# Patient Record
Sex: FEMALE | Race: WHITE | NOT HISPANIC OR LATINO | Employment: UNEMPLOYED | URBAN - METROPOLITAN AREA
[De-identification: names, ages, dates, MRNs, and addresses within clinical notes are randomized per-mention and may not be internally consistent; named-entity substitution may affect disease eponyms.]

---

## 2023-08-02 ENCOUNTER — OFFICE VISIT (OUTPATIENT)
Dept: PULMONOLOGY | Facility: CLINIC | Age: 63
End: 2023-08-02
Payer: COMMERCIAL

## 2023-08-02 ENCOUNTER — HOSPITAL ENCOUNTER (OUTPATIENT)
Dept: GENERAL RADIOLOGY | Facility: HOSPITAL | Age: 63
Discharge: HOME | End: 2023-08-02
Admitting: INTERNAL MEDICINE
Payer: COMMERCIAL

## 2023-08-02 VITALS
WEIGHT: 268.6 LBS | DIASTOLIC BLOOD PRESSURE: 83 MMHG | OXYGEN SATURATION: 94 % | HEART RATE: 61 BPM | RESPIRATION RATE: 16 BRPM | BODY MASS INDEX: 42.16 KG/M2 | HEIGHT: 67 IN | SYSTOLIC BLOOD PRESSURE: 133 MMHG

## 2023-08-02 DIAGNOSIS — G47.33 OBSTRUCTIVE SLEEP APNEA: ICD-10-CM

## 2023-08-02 DIAGNOSIS — J41.8 MIXED SIMPLE AND MUCOPURULENT CHRONIC BRONCHITIS: Primary | ICD-10-CM

## 2023-08-02 DIAGNOSIS — J41.8 MIXED SIMPLE AND MUCOPURULENT CHRONIC BRONCHITIS: ICD-10-CM

## 2023-08-02 DIAGNOSIS — E66.01 MORBID (SEVERE) OBESITY DUE TO EXCESS CALORIES: ICD-10-CM

## 2023-08-02 PROCEDURE — 71046 X-RAY EXAM CHEST 2 VIEWS: CPT

## 2023-08-02 RX ORDER — PANTOPRAZOLE SODIUM 40 MG/1
1 TABLET, DELAYED RELEASE ORAL DAILY
COMMUNITY
Start: 2023-07-08

## 2023-08-02 RX ORDER — LORATADINE 10 MG/1
1 TABLET ORAL DAILY
COMMUNITY
Start: 2023-07-08

## 2023-08-02 RX ORDER — IPRATROPIUM BROMIDE AND ALBUTEROL SULFATE 2.5; .5 MG/3ML; MG/3ML
SOLUTION RESPIRATORY (INHALATION)
COMMUNITY
Start: 2023-05-25

## 2023-08-02 RX ORDER — GUAIFENESIN 600 MG/1
TABLET, EXTENDED RELEASE ORAL
COMMUNITY
Start: 2023-05-25

## 2023-08-02 RX ORDER — GLIMEPIRIDE 1 MG/1
1 TABLET ORAL DAILY
COMMUNITY
Start: 2023-07-08

## 2023-08-02 RX ORDER — TIOTROPIUM BROMIDE AND OLODATEROL 3.124; 2.736 UG/1; UG/1
2 SPRAY, METERED RESPIRATORY (INHALATION)
Qty: 1 EACH | Refills: 11 | Status: SHIPPED | OUTPATIENT
Start: 2023-08-02

## 2023-08-02 RX ORDER — EMPAGLIFLOZIN 25 MG/1
TABLET, FILM COATED ORAL
COMMUNITY
Start: 2023-07-26

## 2023-08-09 RX ORDER — TIOTROPIUM BROMIDE AND OLODATEROL 3.124; 2.736 UG/1; UG/1
2 SPRAY, METERED RESPIRATORY (INHALATION)
Qty: 2 EACH | Refills: 0 | COMMUNITY
Start: 2023-08-09 | End: 2023-08-10

## 2023-08-17 ENCOUNTER — TELEPHONE (OUTPATIENT)
Dept: PULMONOLOGY | Facility: CLINIC | Age: 63
End: 2023-08-17
Payer: COMMERCIAL

## 2023-08-17 NOTE — TELEPHONE ENCOUNTER
AMERICOI--- Patient's pft referral was sent back to me due to patient calling and canceling her test and refused to reschedule.

## 2023-09-05 ENCOUNTER — TELEPHONE (OUTPATIENT)
Dept: PULMONOLOGY | Facility: CLINIC | Age: 63
End: 2023-09-05
Payer: COMMERCIAL

## 2023-09-05 NOTE — TELEPHONE ENCOUNTER
Patient called and asked if Dr. Hillman could put in another PFT in for her. The other one is closed out for her. Patient would like a call back. Thanks

## 2023-09-05 NOTE — TELEPHONE ENCOUNTER
Heather, referral coordinator, reopened the PFT. Called and notified patient she stated she would call scheduling to get PFT scheduled.

## 2023-09-18 ENCOUNTER — HOSPITAL ENCOUNTER (OUTPATIENT)
Dept: RESPIRATORY THERAPY | Facility: HOSPITAL | Age: 63
Discharge: HOME | End: 2023-09-18
Admitting: INTERNAL MEDICINE
Payer: COMMERCIAL

## 2023-09-18 DIAGNOSIS — E66.01 MORBID (SEVERE) OBESITY DUE TO EXCESS CALORIES: ICD-10-CM

## 2023-09-18 DIAGNOSIS — G47.33 OBSTRUCTIVE SLEEP APNEA: ICD-10-CM

## 2023-09-18 DIAGNOSIS — J41.8 MIXED SIMPLE AND MUCOPURULENT CHRONIC BRONCHITIS: ICD-10-CM

## 2023-09-18 PROCEDURE — 94726 PLETHYSMOGRAPHY LUNG VOLUMES: CPT

## 2023-09-18 PROCEDURE — 94060 EVALUATION OF WHEEZING: CPT

## 2023-09-18 PROCEDURE — 94729 DIFFUSING CAPACITY: CPT

## 2023-09-18 RX ORDER — ALBUTEROL SULFATE 2.5 MG/3ML
2.5 SOLUTION RESPIRATORY (INHALATION) ONCE
Status: DISCONTINUED | OUTPATIENT
Start: 2023-09-18 | End: 2023-09-19 | Stop reason: HOSPADM

## 2023-09-18 RX ORDER — ALBUTEROL SULFATE 2.5 MG/3ML
2.5 SOLUTION RESPIRATORY (INHALATION) ONCE
Status: COMPLETED | OUTPATIENT
Start: 2023-09-18 | End: 2023-09-18

## 2023-09-18 RX ADMIN — ALBUTEROL SULFATE 2.5 MG: 2.5 SOLUTION RESPIRATORY (INHALATION) at 11:02

## 2024-01-03 ENCOUNTER — OFFICE VISIT (OUTPATIENT)
Dept: PULMONOLOGY | Facility: CLINIC | Age: 64
End: 2024-01-03
Payer: COMMERCIAL

## 2024-01-03 VITALS
WEIGHT: 271 LBS | TEMPERATURE: 95.9 F | RESPIRATION RATE: 18 BRPM | DIASTOLIC BLOOD PRESSURE: 75 MMHG | SYSTOLIC BLOOD PRESSURE: 129 MMHG | HEIGHT: 67 IN | HEART RATE: 78 BPM | OXYGEN SATURATION: 93 % | BODY MASS INDEX: 42.53 KG/M2

## 2024-01-03 DIAGNOSIS — G47.33 OBSTRUCTIVE SLEEP APNEA: ICD-10-CM

## 2024-01-03 DIAGNOSIS — J41.8 MIXED SIMPLE AND MUCOPURULENT CHRONIC BRONCHITIS: Primary | ICD-10-CM

## 2024-01-03 DIAGNOSIS — E66.01 MORBID (SEVERE) OBESITY DUE TO EXCESS CALORIES: ICD-10-CM

## 2024-01-03 PROCEDURE — 99213 OFFICE O/P EST LOW 20 MIN: CPT | Performed by: INTERNAL MEDICINE

## 2024-01-03 NOTE — PROGRESS NOTES
Pulmonary Office Follow-up    Subjective     JAIR Nick is seen today at the office for   Chief Complaint   Patient presents with    Mixed simple and mucopurulent chronic bronchitis    Follow-up     3 Month          HPI  JAIR Nick is a 63 y.o. female with a PMH significant for chronic bronchitis and sleep apnea presents for follow-up patient denies any shortness of breath cough wheeze or any significant expectoration she has not been compliant with her CPAP but intends to start using it more regularly she denies any chest pain or fever or night sweats      Tobacco use history:        There is no problem list on file for this patient.      Review of Systems  Review of Systems   All other systems reviewed and are negative.    As described in the HPI. Otherwise, remainder of ROS (14 systems) were negative.    Medications, Allergies, Social, and Family Histories reviewed as per EMR.    Result Review :       Data reviewed : Radiologic studies        Objective     Vitals:    01/03/24 1102   BP: 129/75   Pulse: 78   Resp: 18   Temp: 95.9 °F (35.5 °C)   SpO2: 93%         01/03/24  1102   Weight: 123 kg (271 lb)       Physical Exam  Vitals and nursing note reviewed.   Constitutional:       Appearance: She is obese.   HENT:      Head: Normocephalic and atraumatic.      Nose: Nose normal.      Mouth/Throat:      Mouth: Mucous membranes are moist.      Pharynx: Oropharynx is clear.   Eyes:      Extraocular Movements: Extraocular movements intact.      Conjunctiva/sclera: Conjunctivae normal.      Pupils: Pupils are equal, round, and reactive to light.   Cardiovascular:      Rate and Rhythm: Normal rate and regular rhythm.      Pulses: Normal pulses.      Heart sounds: Normal heart sounds.   Pulmonary:      Effort: Pulmonary effort is normal.      Breath sounds: Normal breath sounds.   Abdominal:      General: Abdomen is flat. Bowel sounds are normal.      Palpations: Abdomen is soft.   Musculoskeletal:          General: Normal range of motion.      Cervical back: Normal range of motion and neck supple.   Skin:     General: Skin is warm.      Capillary Refill: Capillary refill takes 2 to 3 seconds.   Neurological:      General: No focal deficit present.      Mental Status: She is alert and oriented to person, place, and time.   Psychiatric:         Mood and Affect: Mood normal.         Behavior: Behavior normal.         No radiology results for the last 90 days.     Assessment & Plan     Diagnoses and all orders for this visit:    1. Mixed simple and mucopurulent chronic bronchitis (Primary)    2. Morbid (severe) obesity due to excess calories    3. Obstructive sleep apnea         Discussion/ Recommendations:   Patient is advised to reduce weight her BMI is 42.44  Advised compliance for CPAP  PFT results were reviewed with the patient which showed normal  Patient is advised to stop smoking  Vaccinations discussed and recommended             Return in about 6 months (around 7/3/2024).          This document has been electronically signed by Claude Hillman MD on January 3, 2024 11:09 EST

## 2024-06-27 ENCOUNTER — TRANSCRIBE ORDERS (OUTPATIENT)
Dept: ADMINISTRATIVE | Facility: HOSPITAL | Age: 64
End: 2024-06-27
Payer: COMMERCIAL

## 2024-06-27 DIAGNOSIS — E11.9 DIABETES MELLITUS WITHOUT COMPLICATION: Primary | ICD-10-CM

## 2024-07-31 ENCOUNTER — OFFICE VISIT (OUTPATIENT)
Dept: PULMONOLOGY | Facility: CLINIC | Age: 64
End: 2024-07-31
Payer: COMMERCIAL

## 2024-07-31 ENCOUNTER — HOSPITAL ENCOUNTER (OUTPATIENT)
Dept: GENERAL RADIOLOGY | Facility: HOSPITAL | Age: 64
Discharge: HOME | End: 2024-07-31
Admitting: INTERNAL MEDICINE
Payer: COMMERCIAL

## 2024-07-31 VITALS
TEMPERATURE: 98 F | DIASTOLIC BLOOD PRESSURE: 81 MMHG | HEART RATE: 66 BPM | RESPIRATION RATE: 18 BRPM | SYSTOLIC BLOOD PRESSURE: 121 MMHG | HEIGHT: 67 IN | WEIGHT: 272.6 LBS | OXYGEN SATURATION: 94 % | BODY MASS INDEX: 42.79 KG/M2

## 2024-07-31 DIAGNOSIS — G47.33 OBSTRUCTIVE SLEEP APNEA: ICD-10-CM

## 2024-07-31 DIAGNOSIS — E66.01 MORBID (SEVERE) OBESITY DUE TO EXCESS CALORIES: ICD-10-CM

## 2024-07-31 DIAGNOSIS — J41.8 MIXED SIMPLE AND MUCOPURULENT CHRONIC BRONCHITIS: ICD-10-CM

## 2024-07-31 DIAGNOSIS — J41.8 MIXED SIMPLE AND MUCOPURULENT CHRONIC BRONCHITIS: Primary | ICD-10-CM

## 2024-07-31 PROCEDURE — 71046 X-RAY EXAM CHEST 2 VIEWS: CPT

## 2024-07-31 PROCEDURE — 99214 OFFICE O/P EST MOD 30 MIN: CPT | Performed by: INTERNAL MEDICINE

## 2024-07-31 RX ORDER — SIMVASTATIN 20 MG
20 TABLET ORAL
COMMUNITY
Start: 2024-07-10

## 2024-07-31 RX ORDER — VARENICLINE TARTRATE 0.5 (11)-1
0.5 KIT ORAL SEE ADMIN INSTRUCTIONS
COMMUNITY
Start: 2024-05-01

## 2024-07-31 RX ORDER — MECLIZINE HYDROCHLORIDE 25 MG/1
25 TABLET ORAL 3 TIMES DAILY PRN
COMMUNITY
Start: 2024-05-01

## 2024-07-31 NOTE — PROGRESS NOTES
Pulmonary Office Follow-up    Subjective     JAIR Nick is seen today at the office for   Chief Complaint   Patient presents with    Bronchitis     6mfu         HPI  JAIR Nick is a 63 y.o. female with a PMH significant for tobacco abuse and obstructive sleep apnea presents for follow-up patient has not been using her CPAP machine for the past 9 months and she wants to try to use it again she continues to smoke but denies any significant shortness of breath or wheezing at this time      Tobacco use history:        There is no problem list on file for this patient.      Review of Systems  Review of Systems   Respiratory:  Positive for apnea.    All other systems reviewed and are negative.    As described in the HPI. Otherwise, remainder of ROS (14 systems) were negative.    Medications, Allergies, Social, and Family Histories reviewed as per EMR.    Result Review :            Objective     Vitals:    07/31/24 0952   BP: 121/81   Pulse: 66   Resp: 18   Temp: 98 °F (36.7 °C)   SpO2: 94%         07/31/24 0952   Weight: 124 kg (272 lb 9.6 oz)       Physical Exam  Vitals and nursing note reviewed.   Constitutional:       Appearance: She is obese.   HENT:      Head: Normocephalic and atraumatic.      Nose: Nose normal.      Mouth/Throat:      Mouth: Mucous membranes are moist.      Pharynx: Oropharynx is clear.   Eyes:      Extraocular Movements: Extraocular movements intact.      Conjunctiva/sclera: Conjunctivae normal.      Pupils: Pupils are equal, round, and reactive to light.   Cardiovascular:      Rate and Rhythm: Normal rate and regular rhythm.      Pulses: Normal pulses.      Heart sounds: Normal heart sounds.   Pulmonary:      Effort: Pulmonary effort is normal.      Breath sounds: Normal breath sounds.   Abdominal:      General: Abdomen is flat. Bowel sounds are normal.      Palpations: Abdomen is soft.   Musculoskeletal:         General: Normal range of motion.      Cervical back: Normal range  of motion and neck supple.   Skin:     General: Skin is warm.      Capillary Refill: Capillary refill takes 2 to 3 seconds.   Neurological:      General: No focal deficit present.      Mental Status: She is alert and oriented to person, place, and time.   Psychiatric:         Mood and Affect: Mood normal.         Behavior: Behavior normal.         No radiology results for the last 90 days.     Assessment & Plan     Diagnoses and all orders for this visit:    1. Mixed simple and mucopurulent chronic bronchitis (Primary)  -     Home Sleep Study; Future  -     XR Chest 2 View; Future    2. Morbid (severe) obesity due to excess calories  -     Home Sleep Study; Future  -     XR Chest 2 View; Future    3. Obstructive sleep apnea  -     Home Sleep Study; Future  -     XR Chest 2 View; Future         Discussion/ Recommendations:   We will order chest x-ray and sleep study for further evaluation  Patient is advised to stop smoking  Advised to reduce weight her BMI is 42.70  Vaccinations discussed and recommended             Return in about 6 months (around 1/31/2025).          This document has been electronically signed by Claude Hillman MD on July 31, 2024 10:10 EDT

## 2024-08-09 ENCOUNTER — HOSPITAL ENCOUNTER (OUTPATIENT)
Dept: CT IMAGING | Facility: HOSPITAL | Age: 64
Discharge: HOME | End: 2024-08-09
Admitting: GENERAL PRACTICE

## 2024-08-09 ENCOUNTER — HOSPITAL ENCOUNTER (OUTPATIENT)
Dept: SLEEP MEDICINE | Facility: HOSPITAL | Age: 64
Discharge: HOME | End: 2024-08-09
Admitting: INTERNAL MEDICINE
Payer: COMMERCIAL

## 2024-08-09 DIAGNOSIS — G47.33 OBSTRUCTIVE SLEEP APNEA: ICD-10-CM

## 2024-08-09 DIAGNOSIS — E66.01 MORBID (SEVERE) OBESITY DUE TO EXCESS CALORIES: ICD-10-CM

## 2024-08-09 DIAGNOSIS — J41.8 MIXED SIMPLE AND MUCOPURULENT CHRONIC BRONCHITIS: ICD-10-CM

## 2024-08-09 DIAGNOSIS — E11.9 DIABETES MELLITUS WITHOUT COMPLICATION: ICD-10-CM

## 2024-08-09 PROCEDURE — 75571 CT HRT W/O DYE W/CA TEST: CPT

## 2024-08-09 PROCEDURE — 95806 SLEEP STUDY UNATT&RESP EFFT: CPT | Performed by: INTERNAL MEDICINE

## 2024-08-09 PROCEDURE — 95806 SLEEP STUDY UNATT&RESP EFFT: CPT

## 2025-05-27 ENCOUNTER — OFFICE VISIT (OUTPATIENT)
Dept: PULMONOLOGY | Facility: CLINIC | Age: 65
End: 2025-05-27
Payer: COMMERCIAL

## 2025-05-27 VITALS
BODY MASS INDEX: 38.45 KG/M2 | HEART RATE: 88 BPM | HEIGHT: 67 IN | SYSTOLIC BLOOD PRESSURE: 130 MMHG | WEIGHT: 245 LBS | OXYGEN SATURATION: 97 % | DIASTOLIC BLOOD PRESSURE: 75 MMHG | TEMPERATURE: 97.3 F | RESPIRATION RATE: 16 BRPM

## 2025-05-27 DIAGNOSIS — G47.33 OBSTRUCTIVE SLEEP APNEA: Primary | ICD-10-CM

## 2025-05-27 DIAGNOSIS — E66.01 MORBID (SEVERE) OBESITY DUE TO EXCESS CALORIES: ICD-10-CM

## 2025-05-27 DIAGNOSIS — J41.8 MIXED SIMPLE AND MUCOPURULENT CHRONIC BRONCHITIS: ICD-10-CM

## 2025-05-27 PROCEDURE — 99214 OFFICE O/P EST MOD 30 MIN: CPT | Performed by: INTERNAL MEDICINE

## 2025-05-27 RX ORDER — OLMESARTAN MEDOXOMIL, AMLODIPINE AND HYDROCHLOROTHIAZIDE TABLET 20/5/12.5 MG 20; 5; 12.5 MG/1; MG/1; MG/1
TABLET ORAL
COMMUNITY
Start: 2024-09-20

## 2025-05-27 RX ORDER — SODIUM, POTASSIUM,MAG SULFATES 17.5-3.13G
SOLUTION, RECONSTITUTED, ORAL ORAL SEE ADMIN INSTRUCTIONS
COMMUNITY
Start: 2025-03-18

## 2025-05-27 RX ORDER — TIRZEPATIDE 5 MG/.5ML
INJECTION, SOLUTION SUBCUTANEOUS
COMMUNITY
Start: 2025-03-10

## 2025-05-27 RX ORDER — OLMESARTAN MEDOXOMIL AND HYDROCHLOROTHIAZIDE 20/12.5 20; 12.5 MG/1; MG/1
TABLET ORAL
COMMUNITY
Start: 2025-05-26

## 2025-05-27 NOTE — PROGRESS NOTES
Pulmonary Office Follow-up    Subjective     Whitney Nick is seen today at the office for   Chief Complaint   Patient presents with    Follow-up     6 month    Mixed simple and mucopurulent chronic bronchitis         HPI  Whitney Nick is a 64 y.o. female with a PMH significant for tobacco abuse chronic bronchitis and excessive snoring presents for follow-up patient has had home sleep study done      Tobacco use history:        There is no problem list on file for this patient.      Review of Systems  Review of Systems   All other systems reviewed and are negative.    As described in the HPI. Otherwise, remainder of ROS (14 systems) were negative.    Medications, Allergies, Social, and Family Histories reviewed as per EMR.    Result Review :            Objective     Vitals:    05/27/25 1517   BP: 130/75   Pulse: 88   Resp: 16   Temp: 97.3 °F (36.3 °C)   SpO2: 97%         05/27/25  1517   Weight: 111 kg (245 lb)       Physical Exam  Vitals and nursing note reviewed.   Constitutional:       Appearance: Normal appearance.   HENT:      Head: Normocephalic and atraumatic.      Nose: Nose normal.      Mouth/Throat:      Pharynx: Oropharynx is clear.   Eyes:      Extraocular Movements: Extraocular movements intact.      Conjunctiva/sclera: Conjunctivae normal.      Pupils: Pupils are equal, round, and reactive to light.   Cardiovascular:      Rate and Rhythm: Normal rate and regular rhythm.      Pulses: Normal pulses.      Heart sounds: Normal heart sounds.   Pulmonary:      Effort: Pulmonary effort is normal.      Breath sounds: Normal breath sounds.   Musculoskeletal:         General: Normal range of motion.      Cervical back: Normal range of motion and neck supple.   Skin:     General: Skin is warm.      Capillary Refill: Capillary refill takes 2 to 3 seconds.   Neurological:      Mental Status: She is alert and oriented to person, place, and time.   Psychiatric:         Mood and Affect: Mood normal.          Behavior: Behavior normal.         No radiology results for the last 90 days.     Assessment & Plan     Diagnoses and all orders for this visit:    1. Obstructive sleep apnea (Primary)  -     PAP Therapy    2. Mixed simple and mucopurulent chronic bronchitis    3. Morbid (severe) obesity due to excess calories         Discussion/ Recommendations:   Sleep study shows moderately severe obstructive sleep apnea  Will start her on CPAP auto at a pressure of 8 x 16  Vaccinations discussed and recommended    Class 2 Severe Obesity (BMI >=35 and <=39.9). Obesity-related health conditions include the following: obstructive sleep apnea. Obesity is unchanged. BMI is is above average; BMI management plan is completed. We discussed low calorie, low carb based diet program, portion control, and increasing exercise.        Return in about 6 months (around 11/27/2025).          This document has been electronically signed by Claude Hillman MD on May 27, 2025 15:35 EDT